# Patient Record
Sex: MALE | Race: WHITE | Employment: UNEMPLOYED | URBAN - METROPOLITAN AREA
[De-identification: names, ages, dates, MRNs, and addresses within clinical notes are randomized per-mention and may not be internally consistent; named-entity substitution may affect disease eponyms.]

---

## 2017-01-25 ENCOUNTER — HOSPITAL ENCOUNTER (OUTPATIENT)
Dept: INFUSION THERAPY | Age: 67
Discharge: HOME | End: 2017-01-25
Payer: MEDICARE

## 2017-01-25 VITALS
TEMPERATURE: 98.5 F | RESPIRATION RATE: 20 BRPM | HEART RATE: 89 BPM | SYSTOLIC BLOOD PRESSURE: 155 MMHG | DIASTOLIC BLOOD PRESSURE: 95 MMHG | OXYGEN SATURATION: 97 %

## 2017-01-25 PROCEDURE — 96402 CHEMO HORMON ANTINEOPL SQ/IM: CPT

## 2017-01-25 PROCEDURE — 74011250636 HC RX REV CODE- 250/636: Performed by: UROLOGY

## 2017-01-25 RX ADMIN — DEGARELIX 80 MG: KIT at 15:31

## 2017-01-25 NOTE — PROGRESS NOTES
[facility] OPI Progress Note    Date: 2017    Name: Harrison Jaeger    MRN: [id number]         : 1950      Mr. Dario Guan was assessed and education was provided. Mr. Piedra's vitals were reviewed and patient was observed for 5 minutes prior to treatment. Visit Vitals    BP (!) 155/95 (BP 1 Location: Right arm, BP Patient Position: Sitting)    Pulse 89    Temp 98.5 °F (36.9 °C)    Resp 20    SpO2 97%       Firmagon 80 mg given SQ right side of abdomen. Patient armband removed and shredded. Mr. Dario Guan was discharged from [place] [address] in stable condition at 32 61 16. He is to return on 17 at 1500 for his next [place] injection. Appointment.   Lizette Hampton RN  2017  4:49 PM

## 2017-01-31 PROBLEM — N52.9 IMPOTENCE OF ORGANIC ORIGIN: Status: ACTIVE | Noted: 2017-01-31

## 2017-01-31 PROBLEM — Z87.448 HISTORY OF HEMATURIA: Status: ACTIVE | Noted: 2017-01-31

## 2017-02-20 ENCOUNTER — HOSPITAL ENCOUNTER (OUTPATIENT)
Dept: LAB | Age: 67
Discharge: HOME | End: 2017-02-20

## 2017-02-22 ENCOUNTER — HOSPITAL ENCOUNTER (OUTPATIENT)
Dept: INFUSION THERAPY | Age: 67
Discharge: HOME | End: 2017-02-22
Payer: MEDICARE

## 2017-02-23 ENCOUNTER — HOSPITAL ENCOUNTER (OUTPATIENT)
Dept: INFUSION THERAPY | Age: 67
Discharge: HOME | End: 2017-02-23
Payer: MEDICARE

## 2017-02-23 VITALS
TEMPERATURE: 97 F | RESPIRATION RATE: 18 BRPM | WEIGHT: 218.4 LBS | BODY MASS INDEX: 31.56 KG/M2 | HEART RATE: 89 BPM | SYSTOLIC BLOOD PRESSURE: 150 MMHG | OXYGEN SATURATION: 98 % | DIASTOLIC BLOOD PRESSURE: 97 MMHG

## 2017-02-23 PROCEDURE — 96401 CHEMO ANTI-NEOPL SQ/IM: CPT

## 2017-02-23 PROCEDURE — 74011250636 HC RX REV CODE- 250/636: Performed by: UROLOGY

## 2017-02-23 RX ADMIN — DEGARELIX 80 MG: 20 POWDER, METERED SUBCUTANEOUS at 15:00

## 2017-03-22 ENCOUNTER — APPOINTMENT (OUTPATIENT)
Dept: INFUSION THERAPY | Age: 67
End: 2017-03-22
Payer: MEDICARE

## 2017-03-23 ENCOUNTER — HOSPITAL ENCOUNTER (OUTPATIENT)
Dept: INFUSION THERAPY | Age: 67
Discharge: HOME | End: 2017-03-23
Payer: MEDICARE

## 2017-03-23 VITALS
SYSTOLIC BLOOD PRESSURE: 140 MMHG | HEART RATE: 98 BPM | OXYGEN SATURATION: 98 % | DIASTOLIC BLOOD PRESSURE: 89 MMHG | RESPIRATION RATE: 18 BRPM | TEMPERATURE: 98.7 F

## 2017-03-23 PROCEDURE — 74011250636 HC RX REV CODE- 250/636: Performed by: UROLOGY

## 2017-03-23 PROCEDURE — 96402 CHEMO HORMON ANTINEOPL SQ/IM: CPT

## 2017-03-23 RX ADMIN — DEGARELIX 80 MG: 20 POWDER, METERED SUBCUTANEOUS at 16:01

## 2017-03-23 NOTE — PROGRESS NOTES
[facility] Progress Note    Date: 2017    Name: Harrison Jaeger    MRN: [id number]         : 1950    Mr Dario Guan arrived at [facility]. Mr. Dario Guan was assessed and education was provided. Mr. Piedra's vitals were reviewed and patient was observed for 5 minutes prior to treatment. Visit Vitals    /89 (BP 1 Location: Left arm, BP Patient Position: Sitting)    Pulse 98    Temp 98.7 °F (37.1 °C)    Resp 18    SpO2 98%       No results found for this or any previous visit (from the past 12 hour(s)). Firmagon 80 mg was administered subcutaneous in  Right side of abdomen. .     Mr. Dario Guan tolerated well, and had no complaints. Patient armband removed and shredded. Mr. Dario Guan was discharged from [place] [address] in stable condition at 1610. He is to return on 17 at 1500 for his next appointment.     Susan Kelley RN  2017  4:40 PM

## 2017-04-05 PROBLEM — C61 PROSTATE CANCER (HCC): Status: ACTIVE | Noted: 2017-04-05

## 2017-04-07 ENCOUNTER — ANESTHESIA EVENT (OUTPATIENT)
Dept: SURGERY | Age: 67
End: 2017-04-07
Payer: MEDICARE

## 2017-04-10 ENCOUNTER — ANESTHESIA (OUTPATIENT)
Dept: SURGERY | Age: 67
End: 2017-04-10
Payer: MEDICARE

## 2017-04-10 ENCOUNTER — HOSPITAL ENCOUNTER (OUTPATIENT)
Age: 67
Setting detail: OUTPATIENT SURGERY
Discharge: HOME | End: 2017-04-10
Attending: UROLOGY | Admitting: UROLOGY
Payer: MEDICARE

## 2017-04-10 ENCOUNTER — APPOINTMENT (OUTPATIENT)
Dept: GENERAL RADIOLOGY | Age: 67
End: 2017-04-10
Attending: UROLOGY
Payer: MEDICARE

## 2017-04-10 VITALS
DIASTOLIC BLOOD PRESSURE: 88 MMHG | SYSTOLIC BLOOD PRESSURE: 124 MMHG | HEART RATE: 84 BPM | BODY MASS INDEX: 30.84 KG/M2 | RESPIRATION RATE: 15 BRPM | HEIGHT: 70 IN | WEIGHT: 215.44 LBS | OXYGEN SATURATION: 99 % | TEMPERATURE: 98 F

## 2017-04-10 LAB
ATRIAL RATE: 76 BPM
CALCULATED P AXIS, ECG09: 39 DEGREES
CALCULATED R AXIS, ECG10: 14 DEGREES
CALCULATED T AXIS, ECG11: 50 DEGREES
DIAGNOSIS, 93000: NORMAL
P-R INTERVAL, ECG05: 132 MS
Q-T INTERVAL, ECG07: 390 MS
QRS DURATION, ECG06: 110 MS
QTC CALCULATION (BEZET), ECG08: 438 MS
VENTRICULAR RATE, ECG03: 76 BPM

## 2017-04-10 PROCEDURE — 93005 ELECTROCARDIOGRAM TRACING: CPT

## 2017-04-10 PROCEDURE — 76010000138 HC OR TIME 0.5 TO 1 HR: Performed by: UROLOGY

## 2017-04-10 PROCEDURE — 74011250636 HC RX REV CODE- 250/636

## 2017-04-10 PROCEDURE — 74011000250 HC RX REV CODE- 250: Performed by: UROLOGY

## 2017-04-10 PROCEDURE — 77030018846 HC SOL IRR STRL H20 ICUM -A: Performed by: UROLOGY

## 2017-04-10 PROCEDURE — 74011250637 HC RX REV CODE- 250/637: Performed by: NURSE ANESTHETIST, CERTIFIED REGISTERED

## 2017-04-10 PROCEDURE — 74011000250 HC RX REV CODE- 250

## 2017-04-10 PROCEDURE — 77030036874 HC SPCR RECTAL HYDRGEL SPACEOAR AGMX -I: Performed by: UROLOGY

## 2017-04-10 PROCEDURE — 77030020268 HC MISC GENERAL SUPPLY: Performed by: UROLOGY

## 2017-04-10 PROCEDURE — 77030032490 HC SLV COMPR SCD KNE COVD -B: Performed by: UROLOGY

## 2017-04-10 PROCEDURE — 76210000020 HC REC RM PH II FIRST 0.5 HR: Performed by: UROLOGY

## 2017-04-10 PROCEDURE — 76060000032 HC ANESTHESIA 0.5 TO 1 HR: Performed by: UROLOGY

## 2017-04-10 PROCEDURE — 76210000006 HC OR PH I REC 0.5 TO 1 HR: Performed by: UROLOGY

## 2017-04-10 PROCEDURE — 74011250636 HC RX REV CODE- 250/636: Performed by: ANESTHESIOLOGY

## 2017-04-10 DEVICE — SPACER RECTAL HYDRGEL 10ML -- SPACEOAR: Type: IMPLANTABLE DEVICE | Site: PERINEUM | Status: FUNCTIONAL

## 2017-04-10 RX ORDER — MAGNESIUM SULFATE 100 %
4 CRYSTALS MISCELLANEOUS AS NEEDED
Status: DISCONTINUED | OUTPATIENT
Start: 2017-04-10 | End: 2017-04-10 | Stop reason: HOSPADM

## 2017-04-10 RX ORDER — OXYCODONE AND ACETAMINOPHEN 5; 325 MG/1; MG/1
1-2 TABLET ORAL
Qty: 8 TAB | Refills: 0 | Status: SHIPPED | OUTPATIENT
Start: 2017-04-10 | End: 2017-04-12 | Stop reason: SDUPTHER

## 2017-04-10 RX ORDER — FENTANYL CITRATE 50 UG/ML
INJECTION, SOLUTION INTRAMUSCULAR; INTRAVENOUS AS NEEDED
Status: DISCONTINUED | OUTPATIENT
Start: 2017-04-10 | End: 2017-04-10 | Stop reason: HOSPADM

## 2017-04-10 RX ORDER — LEVOFLOXACIN 500 MG/1
500 TABLET, FILM COATED ORAL DAILY
Qty: 2 TAB | Refills: 0 | Status: SHIPPED | OUTPATIENT
Start: 2017-04-10 | End: 2017-04-12 | Stop reason: ALTCHOICE

## 2017-04-10 RX ORDER — PROPOFOL 10 MG/ML
INJECTION, EMULSION INTRAVENOUS AS NEEDED
Status: DISCONTINUED | OUTPATIENT
Start: 2017-04-10 | End: 2017-04-10 | Stop reason: HOSPADM

## 2017-04-10 RX ORDER — LORAZEPAM 0.5 MG/1
TABLET ORAL AS NEEDED
COMMUNITY
End: 2021-02-09

## 2017-04-10 RX ORDER — LIDOCAINE HYDROCHLORIDE 10 MG/ML
INJECTION INFILTRATION; PERINEURAL AS NEEDED
Status: DISCONTINUED | OUTPATIENT
Start: 2017-04-10 | End: 2017-04-10 | Stop reason: HOSPADM

## 2017-04-10 RX ORDER — FAMOTIDINE 20 MG/1
20 TABLET, FILM COATED ORAL ONCE
Status: COMPLETED | OUTPATIENT
Start: 2017-04-10 | End: 2017-04-10

## 2017-04-10 RX ORDER — DOCUSATE SODIUM 100 MG/1
100 CAPSULE, LIQUID FILLED ORAL 2 TIMES DAILY
Qty: 60 CAP | Refills: 0 | Status: SHIPPED | OUTPATIENT
Start: 2017-04-10 | End: 2017-05-10

## 2017-04-10 RX ORDER — INSULIN LISPRO 100 [IU]/ML
INJECTION, SOLUTION INTRAVENOUS; SUBCUTANEOUS ONCE
Status: DISCONTINUED | OUTPATIENT
Start: 2017-04-10 | End: 2017-04-10 | Stop reason: HOSPADM

## 2017-04-10 RX ORDER — DEXTROSE 50 % IN WATER (D50W) INTRAVENOUS SYRINGE
25-50 AS NEEDED
Status: DISCONTINUED | OUTPATIENT
Start: 2017-04-10 | End: 2017-04-10 | Stop reason: HOSPADM

## 2017-04-10 RX ORDER — LIDOCAINE HYDROCHLORIDE 20 MG/ML
INJECTION, SOLUTION EPIDURAL; INFILTRATION; INTRACAUDAL; PERINEURAL AS NEEDED
Status: DISCONTINUED | OUTPATIENT
Start: 2017-04-10 | End: 2017-04-10 | Stop reason: HOSPADM

## 2017-04-10 RX ORDER — CEFAZOLIN SODIUM IN 0.9 % NACL 2 G/100 ML
PLASTIC BAG, INJECTION (ML) INTRAVENOUS AS NEEDED
Status: DISCONTINUED | OUTPATIENT
Start: 2017-04-10 | End: 2017-04-10 | Stop reason: HOSPADM

## 2017-04-10 RX ORDER — SODIUM CHLORIDE, SODIUM LACTATE, POTASSIUM CHLORIDE, CALCIUM CHLORIDE 600; 310; 30; 20 MG/100ML; MG/100ML; MG/100ML; MG/100ML
125 INJECTION, SOLUTION INTRAVENOUS CONTINUOUS
Status: DISCONTINUED | OUTPATIENT
Start: 2017-04-10 | End: 2017-04-10 | Stop reason: HOSPADM

## 2017-04-10 RX ORDER — HYDROMORPHONE HYDROCHLORIDE 2 MG/ML
0.5 INJECTION, SOLUTION INTRAMUSCULAR; INTRAVENOUS; SUBCUTANEOUS AS NEEDED
Status: DISCONTINUED | OUTPATIENT
Start: 2017-04-10 | End: 2017-04-10 | Stop reason: HOSPADM

## 2017-04-10 RX ORDER — ONDANSETRON 2 MG/ML
4 INJECTION INTRAMUSCULAR; INTRAVENOUS ONCE
Status: DISCONTINUED | OUTPATIENT
Start: 2017-04-10 | End: 2017-04-10 | Stop reason: HOSPADM

## 2017-04-10 RX ORDER — CEFAZOLIN SODIUM 2 G/50ML
2 SOLUTION INTRAVENOUS
Status: DISCONTINUED | OUTPATIENT
Start: 2017-04-10 | End: 2017-04-10 | Stop reason: HOSPADM

## 2017-04-10 RX ORDER — SODIUM CHLORIDE 0.9 % (FLUSH) 0.9 %
5-10 SYRINGE (ML) INJECTION AS NEEDED
Status: DISCONTINUED | OUTPATIENT
Start: 2017-04-10 | End: 2017-04-10 | Stop reason: HOSPADM

## 2017-04-10 RX ADMIN — FAMOTIDINE 20 MG: 20 TABLET ORAL at 13:26

## 2017-04-10 RX ADMIN — SODIUM CHLORIDE, SODIUM LACTATE, POTASSIUM CHLORIDE, AND CALCIUM CHLORIDE 125 ML/HR: 600; 310; 30; 20 INJECTION, SOLUTION INTRAVENOUS at 13:34

## 2017-04-10 RX ADMIN — Medication 2 G: at 15:41

## 2017-04-10 RX ADMIN — FENTANYL CITRATE 100 MCG: 50 INJECTION, SOLUTION INTRAMUSCULAR; INTRAVENOUS at 15:37

## 2017-04-10 RX ADMIN — PROPOFOL 200 MG: 10 INJECTION, EMULSION INTRAVENOUS at 15:37

## 2017-04-10 RX ADMIN — LIDOCAINE HYDROCHLORIDE 100 MG: 20 INJECTION, SOLUTION EPIDURAL; INFILTRATION; INTRACAUDAL; PERINEURAL at 15:37

## 2017-04-10 NOTE — BRIEF OP NOTE
BRIEF OPERATIVE NOTE    Date of Procedure: 4/10/2017   Preoperative Diagnosis: prostate cancer  c61  Postoperative Diagnosis: prostate cancer  c61    Procedure(s): FIDUCIAL seed MARKER with space OAR Gel Spacer  Surgeon(s) and Role:     * Nish Steel MD - Primary            Surgical Staff:  Circ-1: Ramila Dominguez RN  Scrub Tech-1: Xin Ochoa  Event Time In   Incision Start  3:45 PM   Incision Close  4:02 PM     Anesthesia: General   Estimated Blood Loss: minimal  Specimens: none  Findings: 30 gram prostate implanted with 3 fiducial seeds and SpaceOAR implant. Complications: none  Implants: 3 poly carbon fiducial seed markers. 10 cc SpaceOAR implant.     Nish Steel MD

## 2017-04-10 NOTE — H&P
Patricia Backer   1950      Assessment:       ICD-10-CM ICD-9-CM     1. Prostate cancer ([facility] [address] C61 185     2. Renal cell carcinoma, right C64.1 189. 0     3. Impotence of organic origin N52.9 607.84     4. Lower urinary tract symptoms (LUTS) R39.9 788.99        1. Clinical stage T1c [place] Grade 3+4=7 Adenocarcinoma of the Prostate in 3/12 cores, diagnosed 11/3/15. Presenting PSA of 5.47ng/ml and TRUS volume 66.26cc. ADT initiated 2/10/16 with Degarelix 240mg. Last 1-month Degarlix 80mg injection on 3/23/17. PSA 1.43ng/ml on 1/31/17 - stable. Discussed in detail the procedure for placement of fiducial marker/spaceOAR gel. Reviewed indications, r/b, and SEs of fiducial marker/spaceOAR gel, and proton therapy. Patient expresses understanding and desire to proceed. All questions answered. 2. History of RCC: stage pT1a, clear cell, FG II, s/p right lap partial nephrectomy in 12/2013 by Dr Durant. CT a/p wwo contrast and CXR 5/6/15 without evidence of recurrence. No recent imaging. 3. History of microhematuria, negative workup in 8/2013. Family history of metastatic bladder cancer in mother. Urine Cytology 3/2016, negative for malignancy. 4. 8mm left renal cyst noted on CT a/p wwo contrast 5/6/15, stable, no treatment indicated   5. ED, not currently on medical therapy. 6. History of ischemic CVA, hospitalized 11/3/15 - 11/9/15, now on ASA daily. Neurologist cleared patient to proceed with radiation treatments. 5. Patient's BMI is out of the normal parameters. Information about BMI was given to the patient.     Plan:   1. Reviewed prostate biopsy pathology with patient  2. Discontinue ADT   3. Start Viagra 100 mg PRN, samples provided  4. Urine sent for cytology for micorhematuria - will follow up with results  5.  Follow up as scheduled for fiducial marker/spaceOAR gel placement     I spent >25 minutes with patient reviewing labs, imaging, and prior notes, >50% of this time was spent counseling the patient on treatment options, risks, and benefits thereof.          Chief Complaint   Patient presents with    Prostate Cancer      History of Present Illness: Nicholas Zaidi is a 61 y.o. male who presents today for fiducial marker and spaceOAR gel consult. He is an established patient of Dr. Kelby Stafford and is new to me.      The patient was diagnosed on 11/3/15 with [place] Grade 3+4=7 adenocarcinoma of the prostate in 2/12 cores involving 5-40% of the specimen and [place] Grade 3+3=6 in 1/12 cores involving 5% of the specimen. Presenting PSA of 5.47ng/ml and TRUS volume 66.26cc. ADT initiated on 2/14/16 with Degarelix 240 mg, last Degarelix 80 mg injection administered on 3/23/17. He reports side effect of hot flashes which are not too bothersome. Also reports occasional abnormal sensations in which he feels he feels pressure in his head and gets a metallic taste in his mouth. PSA 1.43ng/ml on 1/31/17 - stable. He previously met with Dr. Allyson Cervantes in 12/2015, however has not started radiation treatment at this time, interested in Proton Therapy.      Patient also with a known history of Renal Cell Carcinoma. S/p right lap partial nephrectomy performed 12/2013 by Dr. Kelby Stafford. CT a/p w/wo contrast and CXR 5/2015 without evidence of recurrence or metastatic disease.      He complains of ED since starting ADT. Not currently on medical therapy. Denies cardiac history, no CP, no nitro use.     History of ischemic CVA and was hospitalized from 11/3/15 to 11/9/15. Denies any residual hemiparesis. Currently on ASA daily.  Patient was to be cleared by Neurologist for radiation therapy.         PSA /TESTOSTERONE - [facility] PSA   Latest Ref Rng & Units 0.00 - 4.00 ng/mL   1/31/2017 1.43   7/29/2016 1.12   9/18/2015 5.47 (H)   5/26/2015 4.82 (H)   5/15/2015 5.59 (H)           Past Medical History:   Diagnosis Date    Asthma      CVA (cerebral vascular accident) ([facility] [address] 11/2016    Dyslipidemia      ED (erectile dysfunction)      Hematuria      Hepatitis C      History of alcohol abuse       stopped 7 years ago    History of UTI      Hypertension      MRSA cellulitis of left foot      Nodule of kidney      Obesity due to excess calories      Prostate cancer (HCC)       stage T1c    Renal cell cancer (HCC)       stage pT1a, clear cell, FG II, s/p right lap partial nephrectomy in 12/2013 by Dr Durant    Stroke [facility])       MINI STROKES    Urinary frequency       since a child            Past Surgical History:   Procedure Laterality Date    HX COLONOSCOPY   07/20/2015     polypectomy and biopsy.  HX NEPHRECTOMY   12/11/2013    HX ORTHOPAEDIC         right foot    HX UROLOGICAL         SMALL TUMOR REMOVED FROM KIDNEY             Current Outpatient Prescriptions on File Prior to Visit   Medication Sig Dispense Refill    degarelix (FIRMAGON) 80 mg solr injection 80 mg by SubCUTAneous route every twenty-eight (28) days. 1 Each 11    aspirin 81 mg chewable tablet Take 81 mg by mouth daily.   0    simvastatin (ZOCOR) 20 mg tablet Take 20 mg by mouth nightly.   0    losartan (COZAAR) 50 mg tablet Take 50 mg by mouth daily.   3    omeprazole (PRILOSEC) 40 mg capsule Pt states not taking because he heard it \"causes early stages of dementia\".  States he only takes when he has heartburn, only about once a month   5      No current facility-administered medications on file prior to visit.            Allergies   Allergen Reactions    Tetanus Immune Globulin Nausea and Vomiting            Social History   Substance Use Topics    Smoking status: Current Some Day Smoker       Packs/day: 0.50       Years: 40.00       Types: Cigarettes    Smokeless tobacco: Never Used    Alcohol use No             Family History   Problem Relation Age of Onset    Colon Cancer Mother      Other Mother         bladder cancer    Diabetes Father      Asthma Brother        Review of Systems  Constitutional: Fever: No  Skin: Rash: No  HEENT: Hearing difficulty: No  Eyes: Blurred vision: No  Cardiovascular: Chest pain: No  Respiratory: Shortness of breath: No  Gastrointestinal: Nausea/vomiting: No  Musculoskeletal: Back pain: No  Neurological: Weakness: No  Psychological: Memory loss: No  Comments/additional findings:      PHYSICAL EXAM:  Visit Vitals    /90 (BP 1 Location: Right arm, BP Patient Position: At rest)    Pulse 83    Temp 98.1 °F (36.7 °C)    Resp 16    Ht 5' 10\" (1.778 m)    Wt 215 lb 7 oz (97.7 kg)    SpO2 97%    BMI 30.91 kg/m2     Constitutional: WDWN, Pleasant and appropriate affect, No acute distress. CV:  No peripheral swelling noted, RRR  Respiratory: No respiratory distress or difficulties, CTAB. Abdomen:  No abdominal masses or tenderness. No CVA tenderness. No hernias noted.  Male:    LISA:Perineum normal to visual inspection, no erythema or irritation, Sphincter with good tone, Rectum with no hemorrhoids, fissures or masses. Prostate is small. SCROTUM:  No scrotal rash or lesions noticed. Normal bilateral testes and epididymis. PENIS: Urethral meatus normal in location and size. No urethral discharge. Skin: No jaundice. Neuro/Psych:  Alert and oriented x 3. Affect appropriate. Lymphatic:   No enlarged inguinal lymph nodes.     REVIEW OF LABS AND IMAGING:         Results for orders placed or performed in visit on 01/31/17   PROSTATE SPECIFIC ANTIGEN, TOTAL (PSA)   Result Value Ref Range     Prostate Specific Ag 1.43 0.00 - 4.00 ng/mL   TESTOSTERONE   Result Value Ref Range     Testosterone <3 (L) 348 - 1197 ng/dL      CT Abd/Pelv w/wo Cont 5/7/2016  Impression:    1. Prior partial nephrectomy, lower pole right kidney.  No evidence of recurrence or metastatic disease. 2. 8mm probable left renal cyst.  3. Focal scar at the upper pole of the right kidney. 4. Mild prostate enlargement. 5. Bilateral testicular hydroceles.   6. Trace pericardial fluid is stable.        Cole Tejada MD

## 2017-04-10 NOTE — DISCHARGE INSTRUCTIONS
Discharge Instructions      Patient: Todd White               Sex: male          DOA: 4/10/2017         YOB: 1950      Age:  61 y.o.        LOS:  LOS: 0 days     ACUTE DIAGNOSES:  prostate cancer  c61    CHRONIC MEDICAL DIAGNOSES:  Problem List as of 4/10/2017  Date Reviewed: 4/10/2017          Codes Class Noted - Resolved    Prostate cancer ([facility] ICD-10-CM: C61  ICD-9-CM: 185  4/5/2017 - Present        Impotence of organic origin ICD-10-CM: N52.9  ICD-9-CM: 607.84  1/31/2017 - Present        History of hematuria ICD-10-CM: Z87.448  ICD-9-CM: V13.09  1/31/2017 - Present        Renal cell carcinoma ([facility] ICD-10-CM: C64.9  ICD-9-CM: 189.0  5/15/2015 - Present        Microscopic hematuria ICD-10-CM: R31.29  ICD-9-CM: 599.72  5/15/2015 - Present        Screening for prostate cancer ICD-10-CM: Z12.5  ICD-9-CM: V76.44  5/15/2015 - Present        Renal mass ICD-10-CM: N28.89  ICD-9-CM: 593.9  8/6/2013 - Present        Hematuria ICD-10-CM: R31.9  ICD-9-CM: 599.70  8/6/2013 - Present        RESOLVED: BPH without urinary obstruction ICD-10-CM: N40.0  ICD-9-CM: 600.00  11/3/2015 - 4/5/2017              DISCHARGE MEDICATIONS:       · It is important that you take the medication exactly as they are prescribed. · Keep your medication in the bottles provided by the pharmacist and keep a list of the medication names, dosages, and times to be taken in your wallet. · Do not take other medications without consulting your doctor. DIET:  Resume previous diet    ACTIVITY: No lifting, Driving, or Strenuous exercise for 48 hours. ADDITIONAL INFORMATION: If you experience any of the following symptoms then please call your primary care physician or return to the emergency room if you cannot get hold of your doctor: Fever, chills, nausea, vomiting, diarrhea, change in mentation, falling, bleeding, shortness of breath. FOLLOW UP CARE:  Dr. Michelle Farnsworth MD as regularly scheduled.     Follow-up with  Val Merritt in about 3 months after completion of Proton Therapy. Information obtained by :  I understand that if any problems occur once I am at home I am to contact my physician. I understand and acknowledge receipt of the instructions indicated above. Physician's or R.N.'s Signature                                                                  Date/Time                                                                                                                                              Patient or Representative Signature                                                          Date/Time        DISCHARGE SUMMARY from Nurse    The following personal items are in your possession at time of discharge:    Dental Appliances: None  Visual Aid: With patient, Glasses     Home Medications: None  Jewelry: None  Clothing: Pants, Shirt, Undergarments, Footwear  Other Valuables: Eyeglasses             PATIENT INSTRUCTIONS:    After general anesthesia or intravenous sedation, for 24 hours or while taking prescription Narcotics:  · Limit your activities  · Do not drive and operate hazardous machinery  · Do not make important personal or business decisions  · Do  not drink alcoholic beverages  · If you have not urinated within 8 hours after discharge, please contact your surgeon on call.     Report the following to your surgeon:  · Excessive pain, swelling, redness or odor of or around the surgical area  · Temperature over 100.5  · Nausea and vomiting lasting longer than 4 hours or if unable to take medications  · Any signs of decreased circulation or nerve impairment to extremity: change in color, persistent  numbness, tingling, coldness or increase pain  · Any questions        What to do at Home:  These are general instructions for a healthy lifestyle:    No smoking/ No tobacco products/ Avoid exposure to second hand smoke    Surgeon General's Warning:  Quitting smoking now greatly reduces serious risk to your health. Obesity, smoking, and sedentary lifestyle greatly increases your risk for illness    A healthy diet, regular physical exercise & weight monitoring are important for maintaining a healthy lifestyle    You may be retaining fluid if you have a history of heart failure or if you experience any of the following symptoms:  Weight gain of 3 pounds or more overnight or 5 pounds in a week, increased swelling in our hands or feet or shortness of breath while lying flat in bed. Please call your doctor as soon as you notice any of these symptoms; do not wait until your next office visit. Recognize signs and symptoms of STROKE:    F-face looks uneven    A-arms unable to move or move unevenly    S-speech slurred or non-existent    T-time-call 911 as soon as signs and symptoms begin-DO NOT go       Back to bed or wait to see if you get better-TIME IS BRAIN. Warning Signs of HEART ATTACK     Call 911 if you have these symptoms:   Chest discomfort. Most heart attacks involve discomfort in the center of the chest that lasts more than a few minutes, or that goes away and comes back. It can feel like uncomfortable pressure, squeezing, fullness, or pain.  Discomfort in other areas of the upper body. Symptoms can include pain or discomfort in one or both arms, the back, neck, jaw, or stomach.  Shortness of breath with or without chest discomfort.  Other signs may include breaking out in a cold sweat, nausea, or lightheadedness. Don't wait more than five minutes to call 911 - MINUTES MATTER! Fast action can save your life. Calling 911 is almost always the fastest way to get lifesaving treatment. Emergency Medical Services staff can begin treatment when they arrive -- up to an hour sooner than if someone gets to the hospital by car.        The discharge information has been reviewed with the patient. The patient verbalized understanding. Discharge medications reviewed with the patient and appropriate educational materials and side effects teaching were provided. Patient armband removed and given to patient to take home.   Patient was informed of the privacy risks if armband lost or stolen

## 2017-04-10 NOTE — ANESTHESIA POSTPROCEDURE EVALUATION
Post-Anesthesia Evaluation and Assessment    Patient: Jayson Webb MRN: [id number]  SSN: [id number]    YOB: 1950  Age: 61 y.o. Sex: male      Data from PACU flowsheet    Cardiovascular Function/Vital Signs  Visit Vitals    BP 98/63    Pulse 92    Temp 36.7 °C (98 °F)    Resp 11    Ht 5' 10\" (1.778 m)    Wt 97.7 kg (215 lb 7 oz)    SpO2 94%    BMI 30.91 kg/m2       Patient is status post general anesthesia for Procedure(s): FIDUCIAL seed MARKER with space OAR Gel Spacer. Nausea/Vomiting: controlled    Postoperative hydration reviewed and adequate. Pain:  Pain Scale 1: Numeric (0 - 10) (04/10/17 1626)  Pain Intensity 1: 0 (04/10/17 1626)   Managed      Mental Status and Level of Consciousness: Alert and oriented     Pulmonary Status:   O2 Device: Room air (04/10/17 1608)   Adequate oxygenation and airway patent    Complications related to anesthesia: None    Post-anesthesia assessment completed.  No concerns    Signed By: Sirisha Valdez MD     April 10, 2017

## 2017-04-10 NOTE — PERIOP NOTES
Pt arrived from OR via stretcher; VSS, NAD, awake and talking; connected to monitor. MAR and anesthesia reports received and acknowledged; will continue to monitor. 1630 - Attempted to call mindi in waiting area; currently unavailable.

## 2017-04-10 NOTE — IP AVS SNAPSHOT
[address] [address] Yadira Sherine Kemp 
[phone] Patient: Aslhy Wilks MRN: [id number] EEV:[id number]/[id number] You are allergic to the following Allergen Reactions Tetanus Immune Globulin Nausea and Vomiting Recent Documentation Height Weight BMI Smoking Status 1.778 m 97.7 kg 30.91 kg/m2 Current Some Day Smoker Emergency Contacts Name Discharge Info Relation Home Work [facility] CAREGIVER [3] Friend [5] [phone] About your hospitalization You were admitted on:  April 10, 2017 You last received care in the:  [facility] PHASE 2 RECOVERY You were discharged on:  April 10, 2017 Unit phone number:  [phone] Why you were hospitalized Your primary diagnosis was:  Not on File Your diagnoses also included:  Prostate Cancer (Hcc) Providers Seen During Your Hospitalizations Provider Role Specialty Primary office phone Catrachita Siddiqui MD Attending Provider Urology [phone] Your Primary Care Physician (PCP) Primary Care Physician Office Phone Office Fax Mayra Jessica [address] [facility] [phone] Follow-up Information Follow up With Details Comments Contact Info Alan Martini MD   [address] [place] [address] [phone] Your Appointments Wednesday April 19, 2017  3:00 PM EDT INFUSION 30 with [facility] INFUSION CHAIR 3 SO [facility] OP INFUSION [facility] ([address]) [facility] [address] [phone]  
[phone] Current Discharge Medication List  
  
START taking these medications Dose & Instructions Dispensing Information Comments Morning Noon Evening Bedtime  
 docusate sodium 100 mg capsule Commonly known as:  Robert Hem Your last dose was:     
   
Your next dose is:    
   
   
 Dose:  100 mg  
 Take 1 Cap by mouth two (2) times a day for 30 days. Quantity:  60 Cap Refills:  0  
     
   
   
   
  
 levoFLOXacin 500 mg tablet Commonly known as:  Antonio Jang Your last dose was: Your next dose is:    
   
   
 Dose:  500 mg Take 1 Tab by mouth daily. Quantity:  2 Tab Refills:  0  
     
   
   
   
  
 oxyCODONE-acetaminophen 5-325 mg per tablet Commonly known as:  PERCOCET Your last dose was: Your next dose is:    
   
   
 Dose:  1-2 Tab Take 1-2 Tabs by mouth every six (6) hours as needed for Pain. Max Daily Amount: 8 Tabs. Quantity:  8 Tab Refills:  0 CONTINUE these medications which have NOT CHANGED Dose & Instructions Dispensing Information Comments Morning Noon Evening Bedtime  
 aspirin 81 mg chewable tablet Your last dose was: Your next dose is:    
   
   
 Dose:  81 mg Take 81 mg by mouth daily. Refills:  0  
     
   
   
   
  
 ATIVAN 0.5 mg tablet Generic drug:  LORazepam  
   
Your last dose was: Your next dose is: Take  by mouth as needed for Anxiety. Refills:  0  
     
   
   
   
  
 degarelix 80 mg Solr injection Commonly known as:  Belkys Norwood Your last dose was: Your next dose is:    
   
   
 Dose:  80 mg  
80 mg by SubCUTAneous route every twenty-eight (28) days. Quantity:  1 Each Refills:  11  
     
   
   
   
  
 losartan 50 mg tablet Commonly known as:  COZAAR Your last dose was: Your next dose is:    
   
   
 Dose:  50 mg Take 50 mg by mouth daily. Refills:  3  
     
   
   
   
  
 omeprazole 40 mg capsule Commonly known as:  PRILOSEC Your last dose was: Your next dose is:    
   
   
 Pt states not taking because he heard it \"causes early stages of dementia\". States he only takes when he has heartburn, only about once a month Refills:  5  
     
   
   
   
  
 simvastatin 20 mg tablet Commonly known as:  ZOCOR Your last dose was: Your next dose is:    
   
   
 Dose:  20 mg Take 20 mg by mouth nightly. Refills:  0 Where to Get Your Medications Information on where to get these meds will be given to you by the nurse or doctor. ! Ask your nurse or doctor about these medications  
  docusate sodium 100 mg capsule  
 levoFLOXacin 500 mg tablet  
 oxyCODONE-acetaminophen 5-325 mg per tablet Discharge Instructions Discharge Instructions Patient: Jeovany Weiss               Sex: male          DOA: 4/10/2017 YOB: 1950      Age:  61 y.o.        LOS:  LOS: 0 days ACUTE DIAGNOSES: 
prostate cancer  c61 CHRONIC MEDICAL DIAGNOSES: 
Problem List as of 4/10/2017  Date Reviewed: 4/10/2017 Codes Class Noted - Resolved Prostate cancer [facility]) ICD-10-CM: T05 ICD-9-CM: 185  4/5/2017 - Present Impotence of organic origin ICD-10-CM: N52.9 ICD-9-CM: 607.84  1/31/2017 - Present History of hematuria ICD-10-CM: Z87.448 ICD-9-CM: V13.09  1/31/2017 - Present Renal cell carcinoma (HCC) ICD-10-CM: C64.9 ICD-9-CM: 189.0  5/15/2015 - Present Microscopic hematuria ICD-10-CM: R31.29 ICD-9-CM: 599.72  5/15/2015 - Present Screening for prostate cancer ICD-10-CM: Z12.5 ICD-9-CM: V76.44  5/15/2015 - Present Renal mass ICD-10-CM: N28.89 ICD-9-CM: 593.9  8/6/2013 - Present Hematuria ICD-10-CM: R31.9 ICD-9-CM: 599.70  8/6/2013 - Present RESOLVED: BPH without urinary obstruction ICD-10-CM: N40.0 ICD-9-CM: 600.00  11/3/2015 - 4/5/2017 DISCHARGE MEDICATIONS:  
 
 
· It is important that you take the medication exactly as they are prescribed. · Keep your medication in the bottles provided by the pharmacist and keep a list of the medication names, dosages, and times to be taken in your wallet. · Do not take other medications without consulting your doctor. DIET:  Resume previous diet ACTIVITY: No lifting, Driving, or Strenuous exercise for 48 hours. ADDITIONAL INFORMATION: If you experience any of the following symptoms then please call your primary care physician or return to the emergency room if you cannot get hold of your doctor: Fever, chills, nausea, vomiting, diarrhea, change in mentation, falling, bleeding, shortness of breath. FOLLOW UP CARE: 
Dr. Yi Trejo MD as regularly scheduled. Follow-up with Dr. Jovanni Quezada in about 3 months after completion of Proton Therapy. Information obtained by : 
I understand that if any problems occur once I am at home I am to contact my physician. I understand and acknowledge receipt of the instructions indicated above. Physician's or R.N.'s Signature                                                                  Date/Time Patient or Representative Signature                                                          Date/Time DISCHARGE SUMMARY from Nurse The following personal items are in your possession at time of discharge: 
 
Dental Appliances: None Visual Aid: With patient, Glasses Home Medications: None Jewelry: None Clothing: Pants, Shirt, Undergarments, Footwear Other Valuables: Thressa Bare PATIENT INSTRUCTIONS: 
 
After general anesthesia or intravenous sedation, for 24 hours or while taking prescription Narcotics: · Limit your activities · Do not drive and operate hazardous machinery · Do not make important personal or business decisions · Do  not drink alcoholic beverages · If you have not urinated within 8 hours after discharge, please contact your surgeon on call. Report the following to your surgeon: 
· Excessive pain, swelling, redness or odor of or around the surgical area · Temperature over 100.5 · Nausea and vomiting lasting longer than 4 hours or if unable to take medications · Any signs of decreased circulation or nerve impairment to extremity: change in color, persistent  numbness, tingling, coldness or increase pain · Any questions What to do at Home: These are general instructions for a healthy lifestyle: No smoking/ No tobacco products/ Avoid exposure to second hand smoke Surgeon General's Warning:  Quitting smoking now greatly reduces serious risk to your health. Obesity, smoking, and sedentary lifestyle greatly increases your risk for illness A healthy diet, regular physical exercise & weight monitoring are important for maintaining a healthy lifestyle You may be retaining fluid if you have a history of heart failure or if you experience any of the following symptoms:  Weight gain of 3 pounds or more overnight or 5 pounds in a week, increased swelling in our hands or feet or shortness of breath while lying flat in bed. Please call your doctor as soon as you notice any of these symptoms; do not wait until your next office visit. Recognize signs and symptoms of STROKE: 
 
F-face looks uneven A-arms unable to move or move unevenly S-speech slurred or non-existent T-time-call 911 as soon as signs and symptoms begin-DO NOT go Back to bed or wait to see if you get better-TIME IS BRAIN. Warning Signs of HEART ATTACK Call 911 if you have these symptoms: 
? Chest discomfort. Most heart attacks involve discomfort in the center of the chest that lasts more than a few minutes, or that goes away and comes back. It can feel like uncomfortable pressure, squeezing, fullness, or pain. ? Discomfort in other areas of the upper body. Symptoms can include pain or discomfort in one or both arms, the back, neck, jaw, or stomach. ? Shortness of breath with or without chest discomfort. ? Other signs may include breaking out in a cold sweat, nausea, or lightheadedness. Don't wait more than five minutes to call [address]! Fast action can save your life. Calling 911 is almost always the fastest way to get lifesaving treatment. Emergency Medical Services staff can begin treatment when they arrive  up to an hour sooner than if someone gets to the hospital by car. The discharge information has been reviewed with the patient. The patient verbalized understanding. Discharge medications reviewed with the patient and appropriate educational materials and side effects teaching were provided. Patient armband removed and given to patient to take home. Patient was informed of the privacy risks if armband lost or stolen Discharge Orders None Introducing [address]! [employer] introduces i[employer] patient portal. Now you can access parts of your medical record, email your doctor's office, and request medication refills online. 1. In your internet browser, go to [email or web address] 2. Click on the First Time User? Click Here link in the Sign In box. You will see the New Member Sign Up page. 3. Enter your i[employer]X Access Code exactly as it appears below. You will not need to use this code after youve completed the sign-up process. If you do not sign up before the expiration date, you must request a new code. · [employer] Access Code: [email or web address] Expires: 4/24/2017  9:51 AM 
 
4. Enter the last four digits of your Social Security Number (xxxx) and Date of Birth (mm/dd/yyyy) as indicated and click Submit. You will be taken to the next sign-up page. 5. Create a iTherX ID.  This will be your i[employer]X login ID and cannot be changed, so think of one that is secure and easy to remember. 6. Create a [employer] password. You can change your password at any time. 7. Enter your Password Reset Question and Answer. This can be used at a later time if you forget your password. 8. Enter your e-mail address. You will receive e-mail notification when new information is available in [address] 9. Click Sign Up. You can now view and download portions of your medical record. 10. Click the Download Summary menu link to download a portable copy of your medical information. If you have questions, please visit the Frequently Asked Questions section of the [employer] website. Remember, [employer] is NOT to be used for urgent needs. For medical emergencies, dial 911. Now available from your iPhone and Android! General Information Please provide this summary of care documentation to your next provider. Patient Signature:  ____________________________________________________________ Date:  ____________________________________________________________  
  
Neita Hidden Provider Signature:  ____________________________________________________________ Date:  ____________________________________________________________

## 2017-04-11 NOTE — OP NOTES
OPERATIVE NOTE     Date of Procedure: 4/10/2017     Preoperative Diagnosis: prostate cancer c61, Clinical stage T1c [place] Grade 3+4=7 Adenocarcinoma of the Prostate in 3/12 cores, diagnosed 11/3/15. Presenting PSA of 5.47ng/ml and TRUS volume 66.26cc prior to ADT. Postoperative Diagnosis: same    Procedure(s):  Transrectal ultrasound  Transperineal placement of  FIDUCIAL seed MARKER with space OAR Gel Spacer    Surgeon(s) and Role:  * Barrington Johnson MD - Primary      Surgical Staff:  Circ-1: Aries Chang RN  Scrub Tech-1: Olinda Mueller    Event Time In   Incision Start 3:45 PM   Incision Close 4:02 PM      Anesthesia: General     Estimated Blood Loss: minimal    Specimens: none    Findings: 34 gram prostate implanted with 3 fiducial seeds and SpaceOAR implant. Complications: none    Implants: 3 poly carbon fiducial seed markers. 10 cc SpaceOAR implant. Procedure: The patient was placed in lithotomy position. A surgical time out was performed and all were in agreement. Saadia-operative antibiotics and Sequential compression devices were in place. A digital rectal exam was performed which revealed 34g prostate with no nodules. An endorectal probe mounted to the surgical table was placed in the rectum. Anesthesia: Prostate Block - 1% Lidocaine was injected in an appropriate fashion in the perineum. Three needles were placed into the perineum under ultrasound guidance and three fiducial markers were placed into the right base, right apex and left lateral prostate gland. All needles were removed. We then directed a needle just anterior to the rectum, between the rectum and prostate. Placement was confirmed with small volume injection of normal saline. Then 10cc of spaceOAR was injected into the area under ultrasound guidance. This produced the desired elevation of the prostate away from the rectum by ultrasound in the midline. The probe was removed and the patient was observed.  Fluoroscopy images of the pelvis was performed confirming appropriate placement of the seeds. Bacitracin was placed along the perineum.  The patient was returned to the supine position and was transferred to the recovery bed      Nish Steel MD

## 2017-04-19 ENCOUNTER — APPOINTMENT (OUTPATIENT)
Dept: INFUSION THERAPY | Age: 67
End: 2017-04-19

## 2018-02-16 PROBLEM — K76.9 LIVER LESION, LEFT LOBE: Status: ACTIVE | Noted: 2018-02-16

## 2018-02-16 PROBLEM — Z71.6 ENCOUNTER FOR SMOKING CESSATION COUNSELING: Status: ACTIVE | Noted: 2018-02-16

## 2019-08-20 PROBLEM — B18.2 HEPATITIS C, CHRONIC (HCC): Status: ACTIVE | Noted: 2019-08-20

## 2019-08-20 PROBLEM — Z86.73 HISTORY OF EMBOLIC STROKE: Status: ACTIVE | Noted: 2019-08-20

## 2019-08-20 PROBLEM — C64.1 CANCER OF KIDNEY, RIGHT (HCC): Status: ACTIVE | Noted: 2019-08-20

## 2019-08-20 PROBLEM — K21.9 GERD (GASTROESOPHAGEAL REFLUX DISEASE): Status: ACTIVE | Noted: 2019-08-20

## 2019-08-20 PROBLEM — A49.02 MRSA (METHICILLIN RESISTANT STAPHYLOCOCCUS AUREUS): Status: ACTIVE | Noted: 2019-08-20

## 2019-08-20 PROBLEM — E78.5 DYSLIPIDEMIA: Status: ACTIVE | Noted: 2019-08-20

## 2019-08-20 PROBLEM — F41.9 ANXIETY: Status: ACTIVE | Noted: 2018-08-02

## 2019-08-20 PROBLEM — R93.89 ABNORMAL FINDING ON IMAGING: Status: ACTIVE | Noted: 2019-08-20

## 2019-08-20 PROBLEM — J44.9 COPD (CHRONIC OBSTRUCTIVE PULMONARY DISEASE) (HCC): Status: ACTIVE | Noted: 2018-05-21

## 2019-08-20 PROBLEM — I10 HTN (HYPERTENSION): Status: ACTIVE | Noted: 2019-08-20

## 2021-08-03 PROBLEM — I10 HTN (HYPERTENSION): Status: RESOLVED | Noted: 2019-08-20 | Resolved: 2021-08-03

## 2021-08-03 PROBLEM — N52.9 ED (ERECTILE DYSFUNCTION): Status: RESOLVED | Noted: 2021-08-03 | Resolved: 2021-08-03

## (undated) DEVICE — NDL PRT INJ NSAF BLNT 18GX1.5 --

## (undated) DEVICE — GAUZE,SPONGE,4"X4",12PLY,STERILE,LF,2'S: Brand: MEDLINE

## (undated) DEVICE — TABLE COVER: Brand: CONVERTORS

## (undated) DEVICE — NEEDLE HYPO 25GA L1.5IN BVL ORIENTED ECLIPSE

## (undated) DEVICE — SOLUTION IRRIG 1000ML H2O STRL BLT

## (undated) DEVICE — TOWEL SURG W16XL26IN BLU NONFENESTRATED DLX ST 2 PER PK

## (undated) DEVICE — KENDALL SCD EXPRESS SLEEVES, KNEE LENGTH, MEDIUM: Brand: KENDALL SCD

## (undated) DEVICE — TEMPLATE BRACHYTHERAPY 17GA GRID DISP FOR ACCUCARE POS AND

## (undated) DEVICE — STERILE POLYISOPRENE POWDER-FREE SURGICAL GLOVES: Brand: PROTEXIS

## (undated) DEVICE — Z DISCONTINUED NO SUB IDED KIT US GEL STANDOFF

## (undated) DEVICE — SYR 10ML CTRL LR LCK NSAF LF --